# Patient Record
Sex: FEMALE | Race: BLACK OR AFRICAN AMERICAN | NOT HISPANIC OR LATINO | Employment: FULL TIME | URBAN - METROPOLITAN AREA
[De-identification: names, ages, dates, MRNs, and addresses within clinical notes are randomized per-mention and may not be internally consistent; named-entity substitution may affect disease eponyms.]

---

## 2024-02-12 ENCOUNTER — OFFICE VISIT (OUTPATIENT)
Dept: SURGERY | Facility: CLINIC | Age: 49
End: 2024-02-12
Payer: COMMERCIAL

## 2024-02-12 VITALS
BODY MASS INDEX: 31.04 KG/M2 | HEIGHT: 59 IN | HEART RATE: 86 BPM | TEMPERATURE: 99.3 F | WEIGHT: 154 LBS | SYSTOLIC BLOOD PRESSURE: 145 MMHG | RESPIRATION RATE: 16 BRPM | OXYGEN SATURATION: 100 % | DIASTOLIC BLOOD PRESSURE: 89 MMHG

## 2024-02-12 DIAGNOSIS — N64.89 PSEUDOANGIOMATOUS STROMAL HYPERPLASIA OF BREAST: Primary | ICD-10-CM

## 2024-02-12 PROCEDURE — 99204 OFFICE O/P NEW MOD 45 MIN: CPT | Performed by: SURGERY

## 2024-02-15 ENCOUNTER — TRANSCRIBE ORDERS (OUTPATIENT)
Dept: ADMINISTRATIVE | Facility: HOSPITAL | Age: 49
End: 2024-02-15
Payer: COMMERCIAL

## 2024-02-15 DIAGNOSIS — N92.0 MENORRHAGIA WITH REGULAR CYCLE: ICD-10-CM

## 2024-02-15 DIAGNOSIS — D25.9 UTERINE LEIOMYOMA, UNSPECIFIED LOCATION: Primary | ICD-10-CM

## 2024-02-15 DIAGNOSIS — N85.2 ENLARGED UTERUS: ICD-10-CM

## 2024-02-20 NOTE — PROGRESS NOTES
GENERAL SURGERY CONSULTATION NOTE    Consult requested by: Dr. Yuan    Patient Care Team:  Provider, No Known as PCP - General  Bipin Russell MD as Surgeon (General Surgery)    Reason for consult: Right breast PASH    Subjective     Patient is a 48 y.o. 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  The patient reports that her nipple started changing colors in August 2023 but otherwise she denies having any pain, discharge from the nipple, fevers, or palpable masses in the breast.  The patient had a screening mammogram performed bilaterally in August 2023.  This demonstrated a 0.3 cm area of calcifications at the 11 o'clock position of the right breast approximately 6 to 7 cm from the nipple and further imaging was recommended.  The patient then subsequently had a diagnostic mammogram performed on 9/11/2023 which again demonstrated a clustered area of calcifications now measuring up to 9 mm in distance.  Patient underwent core needle biopsy of this area which demonstrated pseudoangiomatous stromal hyperplasia, usual ductal hyperplasia, columnar cell hyperplasia and microcyst formation.  No atypical ductal hyperplasia, carcinoma or malignancy was identified.  Microcalcifications, calcium phosphate type were associated with benign ducts and lobules.  This was considered to be a concordant imaging is considered borderline risk due to the presence of PASH.  She was referred to general surgery for further evaluation.  Family history is significant for a paternal aunt who was diagnosed with breast cancer at an older age.  She reports that starting her periods at age 11, she is still having her menstrual cycles.  She has been pregnant twice with 2 live births or first child was born when she was 18, and she did breast-feed for approximately 1 year.  She never does take any birth control pills and never took any hormone replacement therapy.    Review of Systems  "  Genitourinary:  Negative for breast discharge, breast lump and breast pain.   Hematological:  Negative for adenopathy.        History  History reviewed. No pertinent past medical history.  History reviewed. No pertinent surgical history.  Family History   Problem Relation Age of Onset    Hypertension Mother     No Known Problems Father     Breast cancer Paternal Aunt      Social History     Tobacco Use    Smoking status: Never     Passive exposure: Never    Smokeless tobacco: Never   Vaping Use    Vaping Use: Never used   Substance Use Topics    Alcohol use: Yes     Comment: social    Drug use: Never     (Not in a hospital admission)    Allergies:  Patient has no known allergies.    Objective     Vital Signs  /89 (BP Location: Right arm, Patient Position: Sitting, Cuff Size: Adult)   Pulse 86   Temp 99.3 °F (37.4 °C) (Infrared)   Resp 16   Ht 149.9 cm (59\")   Wt 69.9 kg (154 lb)   SpO2 100%   BMI 31.10 kg/m²      Physical Exam  Vitals reviewed. Exam conducted with a chaperone present.   Constitutional:       Appearance: She is well-developed.   HENT:      Head: Normocephalic and atraumatic.   Eyes:      Pupils: Pupils are equal, round, and reactive to light.   Cardiovascular:      Rate and Rhythm: Normal rate and regular rhythm.   Pulmonary:      Effort: Pulmonary effort is normal.      Breath sounds: Normal breath sounds.   Chest:      Comments: Both breasts were examined the upright position.  Both breasts exhibit normal shape and contour.  There is no overlying skin changes, skin thickening, dimpling or discoloration.  At the tip of the nipple bilaterally there is some depigmentation without associated mass or discharge.  No palpable masses are noted bilaterally.  Abdominal:      General: There is no distension.      Palpations: Abdomen is soft.      Tenderness: There is no abdominal tenderness.      Hernia: No hernia is present.   Musculoskeletal:         General: Normal range of motion.      " Cervical back: Normal range of motion.   Lymphadenopathy:      Cervical: No cervical adenopathy.      Upper Body:      Right upper body: No supraclavicular or axillary adenopathy.      Left upper body: No supraclavicular or axillary adenopathy.   Skin:     General: Skin is warm and dry.      Findings: No rash.   Neurological:      Mental Status: She is alert and oriented to person, place, and time.         Results Review:   Lab Results (last 24 hours)       ** No results found for the last 24 hours. **          No radiology results for the last day      I reviewed the patient's new imaging results and agree with the interpretation.  I reviewed the patient's other test results and agree with the interpretation    Assessment & Plan   Right breast PASH    Discussed with patient that not all PASH has to be surgically excised.  She is not at any increased risk for breast cancer based on her Jackie questionnaire.  Therefore, can consider observation as an appropriate choice.  Would recommend obtaining repeat mammogram in 6 months to evaluate the calcifications.  May need to continue the surveillance for 2 years to ensure stability.  I will order the diagnostic mammogram.  If there is a change, we will contact the patient and see her back in my office.  Otherwise, may follow-up with me as needed.    I discussed the patients findings and my recommendations with the patient.     Bipin Russell MD  02/20/24  13:10 EST

## 2024-04-03 ENCOUNTER — HOSPITAL ENCOUNTER (OUTPATIENT)
Dept: MRI IMAGING | Facility: HOSPITAL | Age: 49
Discharge: HOME | End: 2024-04-03
Admitting: OBSTETRICS & GYNECOLOGY
Payer: COMMERCIAL

## 2024-04-03 DIAGNOSIS — N92.0 MENORRHAGIA WITH REGULAR CYCLE: ICD-10-CM

## 2024-04-03 DIAGNOSIS — D25.9 UTERINE LEIOMYOMA, UNSPECIFIED LOCATION: ICD-10-CM

## 2024-04-03 DIAGNOSIS — N85.2 ENLARGED UTERUS: ICD-10-CM

## 2024-04-03 PROCEDURE — 72197 MRI PELVIS W/O & W/DYE: CPT

## 2024-04-03 PROCEDURE — A9579 GAD-BASE MR CONTRAST NOS,1ML: HCPCS | Performed by: OBSTETRICS & GYNECOLOGY

## 2024-04-03 PROCEDURE — 25010000002 GADOTERIDOL PER 1 ML: Performed by: OBSTETRICS & GYNECOLOGY

## 2024-04-03 RX ADMIN — GADOTERIDOL 14 ML: 279.3 INJECTION, SOLUTION INTRAVENOUS at 17:49

## 2024-07-01 DIAGNOSIS — N64.89 PSEUDOANGIOMATOUS STROMAL HYPERPLASIA OF BREAST: Primary | ICD-10-CM

## 2024-10-04 ENCOUNTER — TELEPHONE (OUTPATIENT)
Age: 49
End: 2024-10-04
Payer: COMMERCIAL

## 2024-10-04 NOTE — TELEPHONE ENCOUNTER
Hub staff attempted to follow warm transfer process and was unsuccessful     Caller: Mayuri Dale    Relationship to patient: Self    Best call back number: [phone] (home)       Patient is needing: RETURNED MISSED CALL TO OFFICE.

## 2024-10-04 NOTE — TELEPHONE ENCOUNTER
Left message on machine to call back. Pt was elyse for mammo/us 10/3. Called anastacia. States pt cancelled. Called pt to see if she is needing help rescheduling imaging.

## 2024-10-07 ENCOUNTER — TELEPHONE (OUTPATIENT)
Dept: SURGERY | Facility: CLINIC | Age: 49
End: 2024-10-07
Payer: COMMERCIAL

## 2024-10-07 NOTE — TELEPHONE ENCOUNTER
Caller: Mayuri Dale    Relationship: Self    Best call back number: [phone]    Who are you requesting to speak with (clinical staff, provider,  specific staff member): SUZANNE    PATIENT ATTEMPTED TO RETURN PHONE CALL FROM 10/4

## 2024-11-14 ENCOUNTER — TELEPHONE (OUTPATIENT)
Age: 49
End: 2024-11-14
Payer: COMMERCIAL

## 2024-11-14 NOTE — TELEPHONE ENCOUNTER
Per Dr. Russell  Mammo looks good. Continue routine screening mammograms. F/U prn     Left message on machine to call back